# Patient Record
Sex: FEMALE | Race: WHITE | URBAN - METROPOLITAN AREA
[De-identification: names, ages, dates, MRNs, and addresses within clinical notes are randomized per-mention and may not be internally consistent; named-entity substitution may affect disease eponyms.]

---

## 2022-12-05 ENCOUNTER — OFFICE VISIT (OUTPATIENT)
Dept: URBAN - METROPOLITAN AREA CLINIC 30 | Facility: CLINIC | Age: 72
End: 2022-12-05
Payer: MEDICARE

## 2022-12-05 DIAGNOSIS — Z96.1 PRESENCE OF INTRAOCULAR LENS: ICD-10-CM

## 2022-12-05 DIAGNOSIS — H43.813 VITREOUS DEGENERATION, BILATERAL: Primary | ICD-10-CM

## 2022-12-05 DIAGNOSIS — H52.223 REGULAR ASTIGMATISM, BILATERAL: ICD-10-CM

## 2022-12-05 PROCEDURE — 92134 CPTRZ OPH DX IMG PST SGM RTA: CPT

## 2022-12-05 PROCEDURE — 92004 COMPRE OPH EXAM NEW PT 1/>: CPT

## 2022-12-05 ASSESSMENT — INTRAOCULAR PRESSURE
OS: 16
OD: 16

## 2022-12-05 ASSESSMENT — VISUAL ACUITY
OS: 20/25
OD: 20/20

## 2022-12-05 ASSESSMENT — KERATOMETRY
OS: 43.86
OD: 44.09

## 2022-12-05 NOTE — IMPRESSION/PLAN
Impression: Presence of intraocular lens: Z96.1.
s/p CEIOL OU, s/p YAG OU Plan: In good position, monitor.

## 2022-12-05 NOTE — IMPRESSION/PLAN
Impression: Vitreous degeneration, bilateral: H43.813.
s/p PPV/laser OD
- Pt reports PPV OD s/p CEIOL due to issue after surgery, ? VH/significant floaters. Plan: Mac OCT: WNL OD, WNL OS. Retina flat & intact 360, no holes/breaks/tears. S/S of RD discussed, pt to RTC ASAP if occurs. Monitor. Repeat Mac OCT next visit.

## 2023-12-15 ENCOUNTER — OFFICE VISIT (OUTPATIENT)
Dept: URBAN - METROPOLITAN AREA CLINIC 30 | Facility: CLINIC | Age: 73
End: 2023-12-15
Payer: MEDICARE

## 2023-12-15 DIAGNOSIS — H43.813 VITREOUS DEGENERATION, BILATERAL: Primary | ICD-10-CM

## 2023-12-15 DIAGNOSIS — Z96.1 PRESENCE OF INTRAOCULAR LENS: ICD-10-CM

## 2023-12-15 PROCEDURE — 92134 CPTRZ OPH DX IMG PST SGM RTA: CPT

## 2023-12-15 PROCEDURE — 92014 COMPRE OPH EXAM EST PT 1/>: CPT

## 2023-12-15 ASSESSMENT — INTRAOCULAR PRESSURE
OD: 15
OS: 14

## 2023-12-15 ASSESSMENT — KERATOMETRY
OD: 44.00
OS: 43.95

## 2023-12-15 ASSESSMENT — VISUAL ACUITY
OS: 20/20
OD: 20/20

## 2025-01-22 ENCOUNTER — OFFICE VISIT (OUTPATIENT)
Dept: URBAN - METROPOLITAN AREA CLINIC 30 | Facility: CLINIC | Age: 75
End: 2025-01-22
Payer: MEDICARE

## 2025-01-22 DIAGNOSIS — H52.223 REGULAR ASTIGMATISM, BILATERAL: ICD-10-CM

## 2025-01-22 DIAGNOSIS — Z96.1 PRESENCE OF INTRAOCULAR LENS: Primary | ICD-10-CM

## 2025-01-22 DIAGNOSIS — H43.813 VITREOUS DEGENERATION, BILATERAL: ICD-10-CM

## 2025-01-22 PROCEDURE — 92014 COMPRE OPH EXAM EST PT 1/>: CPT

## 2025-01-22 PROCEDURE — 92134 CPTRZ OPH DX IMG PST SGM RTA: CPT

## 2025-01-22 ASSESSMENT — VISUAL ACUITY
OS: 20/20
OD: 20/25

## 2025-01-22 ASSESSMENT — INTRAOCULAR PRESSURE
OD: 18
OS: 18